# Patient Record
Sex: MALE | Race: WHITE
[De-identification: names, ages, dates, MRNs, and addresses within clinical notes are randomized per-mention and may not be internally consistent; named-entity substitution may affect disease eponyms.]

---

## 2020-06-01 ENCOUNTER — HOSPITAL ENCOUNTER (EMERGENCY)
Dept: HOSPITAL 62 - ER | Age: 40
Discharge: ANOTHER HOSPITAL | End: 2020-06-01
Payer: SELF-PAY

## 2020-06-01 VITALS — SYSTOLIC BLOOD PRESSURE: 147 MMHG | DIASTOLIC BLOOD PRESSURE: 108 MMHG

## 2020-06-01 DIAGNOSIS — G81.94: ICD-10-CM

## 2020-06-01 DIAGNOSIS — R29.810: ICD-10-CM

## 2020-06-01 DIAGNOSIS — I10: ICD-10-CM

## 2020-06-01 DIAGNOSIS — R20.0: ICD-10-CM

## 2020-06-01 DIAGNOSIS — E11.9: ICD-10-CM

## 2020-06-01 DIAGNOSIS — F17.200: ICD-10-CM

## 2020-06-01 DIAGNOSIS — R51: ICD-10-CM

## 2020-06-01 DIAGNOSIS — R29.708: ICD-10-CM

## 2020-06-01 DIAGNOSIS — I63.9: Primary | ICD-10-CM

## 2020-06-01 LAB
ADD MANUAL DIFF: NO
ALBUMIN SERPL-MCNC: 4.3 G/DL (ref 3.5–5)
ALP SERPL-CCNC: 57 U/L (ref 38–126)
ANION GAP SERPL CALC-SCNC: 10 MMOL/L (ref 5–19)
APTT BLD: 26.1 SEC (ref 23.5–35.8)
AST SERPL-CCNC: 45 U/L (ref 17–59)
BASOPHILS # BLD AUTO: 0.1 10^3/UL (ref 0–0.2)
BASOPHILS NFR BLD AUTO: 0.9 % (ref 0–2)
BILIRUB DIRECT SERPL-MCNC: 0.2 MG/DL (ref 0–0.4)
BILIRUB SERPL-MCNC: 0.7 MG/DL (ref 0.2–1.3)
BUN SERPL-MCNC: 8 MG/DL (ref 7–20)
CALCIUM: 9 MG/DL (ref 8.4–10.2)
CHLORIDE SERPL-SCNC: 94 MMOL/L (ref 98–107)
CK MB SERPL-MCNC: 3.52 NG/ML (ref ?–4.55)
CK SERPL-CCNC: 462 U/L (ref 55–170)
CO2 SERPL-SCNC: 25 MMOL/L (ref 22–30)
EOSINOPHIL # BLD AUTO: 0.1 10^3/UL (ref 0–0.6)
EOSINOPHIL NFR BLD AUTO: 0.7 % (ref 0–6)
ERYTHROCYTE [DISTWIDTH] IN BLOOD BY AUTOMATED COUNT: 13.6 % (ref 11.5–14)
GLUCOSE SERPL-MCNC: 320 MG/DL (ref 75–110)
HCT VFR BLD CALC: 47.5 % (ref 37.9–51)
HGB BLD-MCNC: 16.6 G/DL (ref 13.5–17)
INR PPP: 0.93
LYMPHOCYTES # BLD AUTO: 2.9 10^3/UL (ref 0.5–4.7)
LYMPHOCYTES NFR BLD AUTO: 32.4 % (ref 13–45)
MCH RBC QN AUTO: 33 PG (ref 27–33.4)
MCHC RBC AUTO-ENTMCNC: 34.9 G/DL (ref 32–36)
MCV RBC AUTO: 95 FL (ref 80–97)
MONOCYTES # BLD AUTO: 0.6 10^3/UL (ref 0.1–1.4)
MONOCYTES NFR BLD AUTO: 6.1 % (ref 3–13)
NEUTROPHILS # BLD AUTO: 5.4 10^3/UL (ref 1.7–8.2)
NEUTS SEG NFR BLD AUTO: 59.9 % (ref 42–78)
PLATELET # BLD: 187 10^3/UL (ref 150–450)
POTASSIUM SERPL-SCNC: 4.6 MMOL/L (ref 3.6–5)
PROT SERPL-MCNC: 7.2 G/DL (ref 6.3–8.2)
PROTHROMBIN TIME: 12.5 SEC (ref 11.4–15.4)
RBC # BLD AUTO: 5.02 10^6/UL (ref 4.35–5.55)
TOTAL CELLS COUNTED % (AUTO): 100 %
TROPONIN I SERPL-MCNC: < 0.012 NG/ML
WBC # BLD AUTO: 9 10^3/UL (ref 4–10.5)

## 2020-06-01 PROCEDURE — 85025 COMPLETE CBC W/AUTO DIFF WBC: CPT

## 2020-06-01 PROCEDURE — 93005 ELECTROCARDIOGRAM TRACING: CPT

## 2020-06-01 PROCEDURE — 82550 ASSAY OF CK (CPK): CPT

## 2020-06-01 PROCEDURE — 37195 THROMBOLYTIC THERAPY STROKE: CPT

## 2020-06-01 PROCEDURE — 82553 CREATINE MB FRACTION: CPT

## 2020-06-01 PROCEDURE — 80053 COMPREHEN METABOLIC PANEL: CPT

## 2020-06-01 PROCEDURE — 96375 TX/PRO/DX INJ NEW DRUG ADDON: CPT

## 2020-06-01 PROCEDURE — 99291 CRITICAL CARE FIRST HOUR: CPT

## 2020-06-01 PROCEDURE — 85730 THROMBOPLASTIN TIME PARTIAL: CPT

## 2020-06-01 PROCEDURE — 36415 COLL VENOUS BLD VENIPUNCTURE: CPT

## 2020-06-01 PROCEDURE — 85610 PROTHROMBIN TIME: CPT

## 2020-06-01 PROCEDURE — 71045 X-RAY EXAM CHEST 1 VIEW: CPT

## 2020-06-01 PROCEDURE — 93010 ELECTROCARDIOGRAM REPORT: CPT

## 2020-06-01 PROCEDURE — 70450 CT HEAD/BRAIN W/O DYE: CPT

## 2020-06-01 PROCEDURE — 84484 ASSAY OF TROPONIN QUANT: CPT

## 2020-06-01 NOTE — RADIOLOGY REPORT (SQ)
EXAM DESCRIPTION:  CHEST SINGLE VIEW



IMAGES COMPLETED DATE/TIME:  6/1/2020 4:48 pm



REASON FOR STUDY:  stroke alert



COMPARISON:  AP view of the chest from 3/20/2020.



EXAM PARAMETERS:  NUMBER OF VIEWS: One view.

TECHNIQUE:  An AP view of the chest was obtained.

RADIATION DOSE: NA

LIMITATIONS: None.



FINDINGS:  LUNGS AND PLEURA: No consolidation, pleural effusion or pneumothorax.

MEDIASTINUM AND HILAR STRUCTURES: No mediastinal or hilar contour abnormality.

HEART AND VASCULAR STRUCTURES: The cardiac silhouette and pulmonary vasculature are within normal hartman
its.

BONES: No acute findings.

HARDWARE: None in the chest.

OTHER: No other finding.



IMPRESSION:  No acute cardiopulmonary process.



TECHNICAL DOCUMENTATION:  JOB ID:  [id number]

 2011 [employer]- All Rights Reserved



Reading location - IP/workstation name: [email or web address]

## 2020-06-01 NOTE — RADIOLOGY REPORT (SQ)
EXAM DESCRIPTION:  CT HEAD WITHOUT



IMAGES COMPLETED DATE/TIME:  6/1/2020 4:50 pm



REASON FOR STUDY:  stroke alert



COMPARISON:  None.



TECHNIQUE:  Axial images acquired through the brain without intravenous contrast.  Images reviewed wi
th bone, brain and subdural windows.  Additional sagittal and coronal reconstructions were generated.
 Images stored on PACS.

All CT scanners at this facility use dose modulation, iterative reconstruction, and/or weight based d
osing when appropriate to reduce radiation dose to as low as reasonably achievable (ALARA).

[facility]: Dose Right  [facility]: CareDose    [facility]: Dose Right    [facility]: Teradose 4D    [facility]: Smart Technologies



RADIATION DOSE:   mGy.



LIMITATIONS:  None.



FINDINGS:  VENTRICLES: Normal size and contour.

CEREBRUM: No masses.  No hemorrhage.  No midline shift.  No evidence for acute infarction. Normal gra
y/white matter differentiation. No areas of low density in the white matter.

CEREBELLUM: No masses.  No hemorrhage.  No alteration of density.  No evidence for acute infarction.

EXTRAAXIAL SPACES: No fluid collections.  No masses.

ORBITS AND GLOBE: No intra- or extraconal masses.  Normal contour of globe without masses.

CALVARIUM: No fracture.

PARANASAL SINUSES: No fluid or mucosal thickening.

SOFT TISSUES: No mass or hematoma.

OTHER: No other significant finding.



IMPRESSION:  NORMAL BRAIN CT WITHOUT CONTRAST.

EVIDENCE OF ACUTE STROKE: NO.



COMMENT:   Pertinent positive or negative findings of the imaging study reported as a CRITICAL EXAM lorena PAK NP at16:57 on 6/1/2020.

Category of Critical Exam: Stroke alert.

Quality ID # 436: Final reports with documentation of one or more dose reduction techniques (e.g., Au
tomated exposure control, adjustment of the mA and/or kV according to patient size, use of iterative 
reconstruction technique)



TECHNICAL DOCUMENTATION:  JOB ID:  [id number]

 2011 [employer]- All Rights Reserved



Reading location - IP/workstation name: [email or web address]

## 2020-06-01 NOTE — ER DOCUMENT REPORT
ED General





- General


Chief Complaint: S/S of Possible Stroke


Stated Complaint: MOUTH/FACE NUMBNESS


Time Seen by Provider: 06/01/20 16:41


Mode of Arrival: Wheelchair


Notes: 





39-year male with history of untreated diabetes hyperlipidemia obesity 

hypertension presents with 2 weeks of left facial pain followed by about an hour

of facial numbness.  He is noted to have left-sided weakness and when asked when

it started he is not sure but he says everything started when he got here.  He 

also thinks the facial weakness pain started 4 hours ago his girlfriend reports 

less than 1 hour.  He is not altered, but just a difficult historian.  He was 

activated as a stroke alert at triage.


He does not have a history of brain bleeds aneurysms any kind of easy bruising 

bleeding or recent surgeries.


TRAVEL OUTSIDE OF THE [place] IN LAST 30 DAYS: No





- Related Data


Allergies/Adverse Reactions: 


                                        





No Known Allergies Allergy (Verified 03/20/19 06:54)


   











Past Medical History





- General


Information source: Patient





- Social History


Smoking Status: Current Every Day Smoker


Family History: CAD, Hypertension, Thyroid Disfunction


Patient has homicidal ideation: No





- Past Medical History


Cardiac Medical History: Reports: Hx Hypertension


Endocrine Medical History: Reports: Hx Diabetes Mellitus Type 2


Renal/ Medical History: Denies: Hx Peritoneal Dialysis


Musculoskeletal Medical History: Reports Hx Arthritis


Psychiatric Medical History: Reports: Hx Depression





- Immunizations


Hx Diphtheria, Pertussis, Tetanus Vaccination: Yes - 2003





Review of Systems





- Review of Systems


Notes: 





REVIEW OF SYSTEMS





GEN: Denies fever, chills, weight loss


ENT: Denies sore throat, nasal discharge, ear pain


EYES: Denies blurry vision, eye pain, discharge


CV: Denies chest pain, palpitations, edema


RESP: Denies cough, shortness of breath, wheezing


GI: Denies abdominal pain, nausea, vomiting, diarrhea


MSK: Denies joint pain/swelling, edema, 


SKIN: Denies rash, skin lesions


LYMPH: Denies swollen glands/lymph nodes


NEURO: See HPI


PSYCH: Denies depression, suicidal or homicidal ideation








PHYSICAL EXAMINATION





General: No acute distress, well-nourished


Head: Atraumatic, normocephalic


ENT: Mouth normal, oropharynx moist, no exudates or tonsillar enlargement


Eyes: Conjunctiva normal, pupils equal, lids normal


Neck: No JVD, supple, no guarding


CVS: Normal rate, regular rhythm, no murmurs


Resp: No resp distress, equal and normal breath sounds bilaterally


GI: Nondistended, soft, no tenderness to palpation, no rebound or guarding


Ext: No deformities, no edema, normal range of motion in upper and lower ext


Back: No CVA or midline TTP


Skin: No rash, warm


Lymphatic: No lymphadeopathy noted


Neuro: Awake, alert.  Right-sided facial droop with decreased upstroke with 

preserved forehead.  4 out of 5 4- out of 5 left arm and leg


NIH stroke score is 8 rate score is 3





Physical Exam





- Vital signs


Vitals: 


                                        











Temp Pulse Resp BP Pulse Ox


 


 98.7 F   133 H  22 H  151/118 H  95 


 


 06/01/20 16:44  06/01/20 16:44  06/01/20 16:44  06/01/20 16:44  06/01/20 16:44














Course





- Re-evaluation


Re-evalutation: 





06/01/20 17:04


Acute neurologic deficits.  In the setting of face pain for a while, however 

because of pain like migraine infection digital neuralgia should not cause left-

sided weakness, NIH stroke score is 8 , Race score is 3


On exam the patient has right-sided facial droop and a peripheral cranial nerve 

VII pattern as well as significant left arm and leg droop


He is obese with a history of multiple stroke risk factors and has been out of 

care for quite a while.


Reviewed in depth the alteplase inclusion criteria and risk factors


He is within a 4.5-hour window, he meets criteria for extended TPA window


He has no contraindication


He was consented by me verbally including risks of bleed and death and agreed


I paged [facility] for transfer of stroke at 503 p.m.


His noncontrast CT was read negative at 5 PM


I am sending him back for a CT CTA





06/01/20 17:08





06/01/20 17:47


Discussed with Dr. Copeland.  Accepted.  Miah arrived sooner than we thought, 

and TPA was infusing.  I will forego vascular imaging and get the patient to the

stroke center.  Estimate length with he and his girlfriend.





- Vital Signs


Vital signs: 


                                        











Temp Pulse Resp BP Pulse Ox


 


 98.7 F   133 H  27 H  147/108 H  98 


 


 06/01/20 17:00  06/01/20 17:00  06/01/20 17:01  06/01/20 17:01  06/01/20 17:01














- Laboratory


Result Diagrams: 


                                 06/01/20 16:55





                                 06/01/20 16:55


Laboratory results interpreted by me: 


                                        











  06/01/20





  16:55


 


Sodium  129.4 L


 


Chloride  94 L


 


Glucose  320 H


 


Creatine Kinase  462 H














- Diagnostic Test


Radiology reviewed: Image reviewed, Reports reviewed





Critical Care Note





- Critical Care Note


Total time excluding time spent on procedures (mins): 40


Comments: 





The above patient is critically ill.  Not including procedures, but including 

direct re-evaluations, speaking with patient and/or consultants, interpreting 

results, and documenting, I spent the total amount of minute listed listed above

on critical care time





Discharge





- Discharge


Clinical Impression: 


Stroke


Qualifiers:


 CVA mechanism: unspecified Qualified Code(s): I63.9 - Cerebral infarction, 

unspecified





Condition: Critical


Disposition: [facility]

## 2020-06-01 NOTE — ER DOCUMENT REPORT
ED Medical Screen (RME)





- General


Chief Complaint: Numbness of Face


Stated Complaint: MOUTH/FACE NUMBNESS


Time Seen by Provider: 06/01/20 16:41


Mode of Arrival: Wheelchair


Information source: Patient


Notes: 





39-year-old male patient presents the emergency department as a stroke alert.  

Patient reports for the last 2 to 3 months he has been having pain to the right 

side of his face.  He states that 3 to 4 hours ago he started having numbness to

the left side of his face, slurred speech and weakness in his left upper 

extremity.  He has facial drooping, he has definitely limited strength in his 

upper extremity.  Stroke alert was called, patient was taken straight to CT.  

Attending physician on the main side of the ER made aware.





I have greeted and performed a rapid initial assessment of this patient.  A 

comprehensive ED assessment and evaluation of the patient, analysis of test 

results and completion of the medical decision making process will be conducted 

by additional ED providers.  I have specifically instructed the patient or 

family members with the patient to immediately return to any nursing staff 

should anything change in the patient's condition or with their chief complaint.





TRAVEL OUTSIDE OF THE [place] IN LAST 30 DAYS: No





- Related Data


Allergies/Adverse Reactions: 


                                        





No Known Allergies Allergy (Verified 03/20/19 06:54)


   











Past Medical History





- Past Medical History


Cardiac Medical History: Reports: Hx Hypertension


Endocrine Medical History: Reports: Hx Diabetes Mellitus Type 2


Renal/ Medical History: Denies: Hx Peritoneal Dialysis


Musculoskeltal Medical History: Reports Hx Arthritis


Psychiatric Medical History: Reports: Hx Depression





- Immunizations


Hx Diphtheria, Pertussis, Tetanus Vaccination: Yes - 2003

## 2020-06-01 NOTE — EKG REPORT
SEVERITY:- OTHERWISE NORMAL ECG -

SINUS TACHYCARDIA

:

Confirmed by: Mary Ayala 01-Jun-2020 22:39:33

## 2020-07-02 ENCOUNTER — HOSPITAL ENCOUNTER (EMERGENCY)
Dept: HOSPITAL 62 - ER | Age: 40
Discharge: HOME | End: 2020-07-02
Payer: SELF-PAY

## 2020-07-02 VITALS — SYSTOLIC BLOOD PRESSURE: 130 MMHG | DIASTOLIC BLOOD PRESSURE: 87 MMHG

## 2020-07-02 DIAGNOSIS — R19.7: ICD-10-CM

## 2020-07-02 DIAGNOSIS — R10.9: Primary | ICD-10-CM

## 2020-07-02 DIAGNOSIS — I10: ICD-10-CM

## 2020-07-02 DIAGNOSIS — Z86.73: ICD-10-CM

## 2020-07-02 DIAGNOSIS — F17.200: ICD-10-CM

## 2020-07-02 DIAGNOSIS — E11.9: ICD-10-CM

## 2020-07-02 DIAGNOSIS — E78.5: ICD-10-CM

## 2020-07-02 DIAGNOSIS — R11.2: ICD-10-CM

## 2020-07-02 LAB
ADD MANUAL DIFF: NO
ALBUMIN SERPL-MCNC: 4.6 G/DL (ref 3.5–5)
ALP SERPL-CCNC: 70 U/L (ref 38–126)
ANION GAP SERPL CALC-SCNC: 10 MMOL/L (ref 5–19)
APPEARANCE UR: (no result)
APTT PPP: (no result) S
AST SERPL-CCNC: 40 U/L (ref 17–59)
BASOPHILS # BLD AUTO: 0.1 10^3/UL (ref 0–0.2)
BASOPHILS NFR BLD AUTO: 0.7 % (ref 0–2)
BILIRUB DIRECT SERPL-MCNC: 0.1 MG/DL (ref 0–0.4)
BILIRUB SERPL-MCNC: 0.8 MG/DL (ref 0.2–1.3)
BILIRUB UR QL STRIP: (no result)
BUN SERPL-MCNC: 14 MG/DL (ref 7–20)
CALCIUM: 10 MG/DL (ref 8.4–10.2)
CHLORIDE SERPL-SCNC: 97 MMOL/L (ref 98–107)
CO2 SERPL-SCNC: 27 MMOL/L (ref 22–30)
EOSINOPHIL # BLD AUTO: 0.1 10^3/UL (ref 0–0.6)
EOSINOPHIL NFR BLD AUTO: 0.6 % (ref 0–6)
ERYTHROCYTE [DISTWIDTH] IN BLOOD BY AUTOMATED COUNT: 12.7 % (ref 11.5–14)
GLUCOSE SERPL-MCNC: 241 MG/DL (ref 75–110)
GLUCOSE UR STRIP-MCNC: 50 MG/DL
HCT VFR BLD CALC: 48.5 % (ref 37.9–51)
HGB BLD-MCNC: 17.1 G/DL (ref 13.5–17)
KETONES UR STRIP-MCNC: NEGATIVE MG/DL
LYMPHOCYTES # BLD AUTO: 2.1 10^3/UL (ref 0.5–4.7)
LYMPHOCYTES NFR BLD AUTO: 23.4 % (ref 13–45)
MCH RBC QN AUTO: 32.8 PG (ref 27–33.4)
MCHC RBC AUTO-ENTMCNC: 35.2 G/DL (ref 32–36)
MCV RBC AUTO: 93 FL (ref 80–97)
MONOCYTES # BLD AUTO: 0.8 10^3/UL (ref 0.1–1.4)
MONOCYTES NFR BLD AUTO: 8.5 % (ref 3–13)
NEUTROPHILS # BLD AUTO: 6 10^3/UL (ref 1.7–8.2)
NEUTS SEG NFR BLD AUTO: 66.8 % (ref 42–78)
PH UR STRIP: 5 [PH] (ref 5–9)
PLATELET # BLD: 200 10^3/UL (ref 150–450)
POTASSIUM SERPL-SCNC: 4.6 MMOL/L (ref 3.6–5)
PROT SERPL-MCNC: 7.8 G/DL (ref 6.3–8.2)
PROT UR STRIP-MCNC: 100 MG/DL
RBC # BLD AUTO: 5.2 10^6/UL (ref 4.35–5.55)
SP GR UR STRIP: 1.03
TOTAL CELLS COUNTED % (AUTO): 100 %
UROBILINOGEN UR-MCNC: 2 MG/DL (ref ?–2)
WBC # BLD AUTO: 9 10^3/UL (ref 4–10.5)

## 2020-07-02 PROCEDURE — 83690 ASSAY OF LIPASE: CPT

## 2020-07-02 PROCEDURE — 80053 COMPREHEN METABOLIC PANEL: CPT

## 2020-07-02 PROCEDURE — 96374 THER/PROPH/DIAG INJ IV PUSH: CPT

## 2020-07-02 PROCEDURE — 99284 EMERGENCY DEPT VISIT MOD MDM: CPT

## 2020-07-02 PROCEDURE — 85025 COMPLETE CBC W/AUTO DIFF WBC: CPT

## 2020-07-02 PROCEDURE — 74177 CT ABD & PELVIS W/CONTRAST: CPT

## 2020-07-02 PROCEDURE — 81001 URINALYSIS AUTO W/SCOPE: CPT

## 2020-07-02 PROCEDURE — 36415 COLL VENOUS BLD VENIPUNCTURE: CPT

## 2020-07-02 PROCEDURE — 96361 HYDRATE IV INFUSION ADD-ON: CPT

## 2020-07-02 NOTE — ER DOCUMENT REPORT
ED GI/





- General


Chief Complaint: Nausea/Vomiting/Diarrhea


Stated Complaint: NAUSEA/VOMITING


Time Seen by Provider: 07/02/20 13:09


Primary Care Provider: 


YESENIA DON MD [COMMUNITY BASED STAFF] - Follow up tomorrow (Call for follow-up 

appointment as soon as possible.)


Information source: Patient


Notes: 





40-year-old male past medical history significant for recent CVA, hypertension, 

diabetes, hyperlipidemia presents to the emergency room complaining of abdominal

pain which he describes as generalized pressure, nausea, vomiting, diarrhea.  

States he is having 10 episodes of diarrhea daily.  States his symptoms have 

been going on for the past month.  Gets worse after he eats.  Has tried taking 

Pepto-Bismol and Imodium without relief.  Denies any recent travel.  Denies any 

COVID-19 exposure.  Patient states he was seen here and transferred to [facility] patient states after discharge she has not followed up with any

other providers.  States most of his medications are new in the past month.  

Denies any other recent travel.  No COVID-19 exposure.


TRAVEL OUTSIDE OF THE [place] IN LAST 30 DAYS: No





- Related Data


Allergies/Adverse Reactions: 


                                        





No Known Allergies Allergy (Verified 07/02/20 11:58)


   











Past Medical History





- General


Information source: Patient





- Social History


Smoking Status: Current Some Day Smoker


Frequency of alcohol use: None


Drug Abuse: None


Family History: CAD, Hypertension, Thyroid Disfunction


Patient has homicidal ideation: No





- Past Medical History


Cardiac Medical History: Reports: Hx Hypertension


Neurological Medical History: Reports: Hx Cerebrovascular Accident


Endocrine Medical History: Reports: Hx Diabetes Mellitus Type 2


Renal/ Medical History: Denies: Hx Peritoneal Dialysis


Musculoskeletal Medical History: Reports Hx Arthritis


Psychiatric Medical History: Reports: Hx Depression





- Immunizations


Hx Diphtheria, Pertussis, Tetanus Vaccination: Yes - 2003





Review of Systems





- Review of Systems


Constitutional: No symptoms reported


EENT: No symptoms reported


Cardiovascular: No symptoms reported


Respiratory: No symptoms reported


Gastrointestinal: Abdominal pain, Diarrhea, Nausea, Vomiting


Genitourinary: No symptoms reported


Musculoskeletal: No symptoms reported


Skin: No symptoms reported


Neurological/Psychological: No symptoms reported


-: Yes All other systems reviewed and negative





Physical Exam





- Vital signs


Vitals: 


                                        











Temp Pulse Resp BP Pulse Ox


 


 97.6 F   108 H  20   152/110 H  96 


 


 07/02/20 11:20  07/02/20 11:20  07/02/20 11:20  07/02/20 11:20  07/02/20 11:20














- Notes


Notes: 





VITAL SIGNS: Within normal limits.


GENERAL: Mild acute distress, non-toxic appearance.  


HEAD:  Normal with no signs of head trauma.


EYES:  PERRLA, EOMI, conjunctiva normal, no discharge.  


EARS:  Hearing grossly intact.


NOSE: Normal.


THROAT:  Oropharynx is normal. 


NECK:  Normal range of motion, no tenderness, supple, no lymphadenopathy, No 

adenopathy, no JVD.   


CHEST:  Clear breath sounds bilaterally.  No wheezes, rales, or rhonchi.  


CARDIAC:  Regular rate and rhythm.  S1 and S2, without murmurs, gallops, or 

rubs.


VASCULAR:  No Edema.  Peripheral pulses normal and equal in all extremities.


ABDOMEN: Normal and soft with no tenderness, no masses or pulsatile masses.  No 

organomegaly, no rebound.  Bowel sounds x4.


GASTROINTESTINAL: Bowel sounds normal


GENITOURINARY: Normal, No tenderness


LYMPATHTIC:  No lymphadenopathy noted.


MUSCULOSKELETAL:  Good range of motion of all major joints. Extremities without 

clubbing, cyanosis or edema.  


NEUROLOGICAL:  Alert and oriented x 3.  No focal sensory or strength deficits.  

Speech normal.  Follows commands appropriately.


PSYCHIATRIC:  Normal Affect, judgement and mood.


SKIN:  Normal appearance with no rashes or lesions.





Course





- Re-evaluation


Re-evalutation: 





07/02/20 17:56


Patient is resting comfortably he is pain-free on exam.  He is afebrile and 

nontoxic-appearing.  Vital signs have improved.  Patient has had no vomiting or 

diarrhea since arrival to the emergency room.  He is able to tolerate p.o. 

fluids.  All test results were reviewed with the patient.  Commended clear 

liquid diet for the next 24 hours and then advance to bland diet as tolerated.  

He was counseled on need to follow-up outpatient with a primary care physician f

or further evaluation, on-call physician was provided.  Patient was given strict

return to the emergency room guidelines.  Return for any new or worsening 

symptoms.  All questions were answered.  Patient verbalized understanding and 

agrees with plan of care.


07/02/20 21:30





07/02/20 21:31








- Vital Signs


Vital signs: 


                                        











Temp Pulse Resp BP Pulse Ox


 


 98.1 F   100   16   130/87 H  96 


 


 07/02/20 18:02  07/02/20 18:02  07/02/20 18:02  07/02/20 18:02  07/02/20 18:02














- Laboratory


Result Diagrams: 


                                 07/02/20 14:34





                                 07/02/20 14:34


Laboratory results interpreted by me: 


                                        











  07/02/20 07/02/20 07/02/20





  14:34 14:34 14:40


 


Hgb  17.1 H  


 


Sodium   134.2 L 


 


Chloride   97 L 


 


Glucose   241 H 


 


Urine Protein    100 H


 


Urine Glucose (UA)    50 H


 


Urine Bilirubin    SMALL H


 


Urine Urobilinogen    2.0 H














- Diagnostic Test


Radiology reviewed: Reports reviewed





Discharge





- Discharge


Clinical Impression: 


 Abdominal pain of unknown etiology





Nausea & vomiting


Qualifiers:


 Vomiting type: unspecified Vomiting Intractability: non-intractable Qualified 

Code(s): R11.2 - Nausea with vomiting, unspecified





Diarrhea


Qualifiers:


 Diarrhea type: unspecified type Qualified Code(s): R19.7 - Diarrhea, unspeci

fied





Condition: Stable


Disposition: HOME, SELF-CARE


Instructions:  Abdominal Pain ([facility]), Antinausea Medication ([facility]), Diarrhea, 

Nonspecific ([facility]), Vomiting ([facility])


Additional Instructions: 


Clear liquid diet for the next 24 hours.  Nausea medication as prescribed.  

Outpatient follow-up with a primary care physician please call as soon as 

possible for an appointment.  Return to the emergency room for any new or 

worsening symptoms.


Prescriptions: 


Ondansetron [Zofran Odt 4 mg Tablet] 1 tab PO Q4H PRN #15 tab.rapdis


 PRN Reason: For Nausea/Vomiting


Referrals: 


YESENIA DON MD [COMMUNITY BASED STAFF] - Follow up tomorrow (Call for follow-up 

appointment as soon as possible.)

## 2020-07-02 NOTE — RADIOLOGY REPORT (SQ)
EXAM DESCRIPTION:  CT ABD/PELVIS WITH IV ONLY



IMAGES COMPLETED DATE/TIME:  7/2/2020 3:34 pm



REASON FOR STUDY:  abdominal pain



COMPARISON:  None.



TECHNIQUE:  CT scan of the abdomen and pelvis performed using helical scanning technique with dynamic
 intravenous contrast injection.  No oral contrast. Images reviewed with lung, soft tissue, and bone 
windows. Reconstructed coronal and sagittal MPR images reviewed. Delayed images for evaluation of the
 urinary system also acquired. All images stored on PACS.

All CT scanners at this facility use dose modulation, iterative reconstruction, and/or weight based d
osing when appropriate to reduce radiation dose to as low as reasonably achievable (ALARA).

[facility]: Dose Right  [facility]: CareDose    [facility]: Dose Right    [facility]: Teradose 4D    [facility]: [employer]



CONTRAST TYPE AND DOSE:  contrast/concentration: Isovue 350.00 mmol/ml; Total Contrast Delivered: 100
.0 ml; Total Saline Delivered: 72.0 ml



RENAL FUNCTION:  BUN 14 creatinine 1



RADIATION DOSE:  CT Rad equipment meets quality standard of care and radiation dose reduction techniq
ues were employed. CTDIvol: 19.2 - 21.1 mGy. DLP: 2578 mGy-cm..



LIMITATIONS:  None.



FINDINGS:  LOWER CHEST: No significant findings. No nodules or infiltrates.

LIVER: There is mild hypoattenuation of the liver.  No mass.

SPLEEN: Normal size. No focal lesions.

PANCREAS: No masses. No significant calcifications. No adjacent inflammation or peripancreatic fluid 
collections. Pancreatic duct not dilated.

GALLBLADDER: No identified stones by CT criteria. No inflammatory changes to suggest cholecystitis.

ADRENAL GLANDS: No significant masses or asymmetry.

RIGHT KIDNEY AND URETER: No solid masses.   No significant calcifications.   No hydronephrosis or hyd
roureter.

LEFT KIDNEY AND URETER: No solid masses.   No significant calcifications.   No hydronephrosis or hydr
oureter.

AORTA AND VESSELS: No aneurysm. No dissection. Renal arteries, SMA, celiac without stenosis.

RETROPERITONEUM: No retroperitoneal adenopathy, hemorrhage or masses.

BOWEL AND PERITONEAL CAVITY: No masses or inflammatory changes. No free fluid or peritoneal masses.

APPENDIX: Normal.

PELVIS: No mass.  No free fluid. Normal bladder.

ABDOMINAL WALL: No masses. No hernias.

BONES: No significant or acute findings.

OTHER: No other significant finding.



IMPRESSION:  There appears to be mild hepatic steatosis.  No other significant finding is seen in the
 abdomen or pelvis.



TECHNICAL DOCUMENTATION:  JOB ID:  [id number]

Quality ID # 436: Final reports with documentation of one or more dose reduction techniques (e.g., Au
tomated exposure control, adjustment of the mA and/or kV according to patient size, use of iterative 
reconstruction technique)

 2011 [employer]- All Rights Reserved



Reading location - IP/workstation name: [email or web address]

## 2020-09-14 ENCOUNTER — HOSPITAL ENCOUNTER (EMERGENCY)
Dept: HOSPITAL 62 - ER | Age: 40
Discharge: HOME | End: 2020-09-14
Payer: SELF-PAY

## 2020-09-14 VITALS — DIASTOLIC BLOOD PRESSURE: 116 MMHG | SYSTOLIC BLOOD PRESSURE: 153 MMHG

## 2020-09-14 DIAGNOSIS — K08.89: ICD-10-CM

## 2020-09-14 DIAGNOSIS — F17.210: ICD-10-CM

## 2020-09-14 DIAGNOSIS — Z91.14: ICD-10-CM

## 2020-09-14 DIAGNOSIS — T46.4X6A: ICD-10-CM

## 2020-09-14 DIAGNOSIS — I10: ICD-10-CM

## 2020-09-14 DIAGNOSIS — K02.9: Primary | ICD-10-CM

## 2020-09-14 DIAGNOSIS — E11.9: ICD-10-CM

## 2020-09-14 PROCEDURE — 99283 EMERGENCY DEPT VISIT LOW MDM: CPT

## 2020-09-14 NOTE — ER DOCUMENT REPORT
Entered by HEMANTH JOHNSON SCRIBE  09/14/20 0658 





Acting as scribe for:JANIYA VARELA MD





ED Oral Problem





- General


Chief Complaint: Toothache


Stated Complaint: TOOTHACHE


Time Seen by Provider: 09/14/20 06:16


Information source: Patient


Notes: 





This 40 year old male patient presents to the emergency department today with 

right mouth pain. Patient states he felt a filling break last night in the 

bottom right of his mouth. Patient states BC powder and Orajel have not provided

relief. Denies any fever, chills, or ear pain. Patient reports history of HTN 

and DM, which he has not been taking medication for lately. Patient also reports

a TIA x3 months ago, and was treated at [facility]. 


TRAVEL OUTSIDE OF THE [place] IN LAST 30 DAYS: No





- Related Data


Allergies/Adverse Reactions: 


                                        





No Known Allergies Allergy (Verified 07/02/20 11:58)


   








Home Medications: Lisinopril





Past Medical History





- General


Information source: Patient





- Social History


Smoking Status: Current Every Day Smoker


Cigarette use (# per day): Yes


Family History: CAD, Hypertension, Thyroid Disfunction





- Past Medical History


Cardiac Medical History: Reports: Hx Hypertension


Neurological Medical History: Reports: Other - TIA 2020


Endocrine Medical History: Reports: Hx Diabetes Mellitus Type 2


Musculoskeletal Medical History: Reports Hx Arthritis


Psychiatric Medical History: Reports: Hx Depression





- Immunizations


Hx Diphtheria, Pertussis, Tetanus Vaccination: Yes - 2003





Review of Systems





- Review of Systems


Constitutional: See HPI.  denies: Chills, Fever


EENT: See HPI, Mouth pain - R, Dental problem - R bottom tooth.  denies: Ear 

pain


Cardiovascular: No symptoms reported


Respiratory: No symptoms reported


Gastrointestinal: No symptoms reported


Genitourinary: No symptoms reported


Male Genitourinary: No symptoms reported


Musculoskeletal: No symptoms reported


Skin: No symptoms reported


Hematologic/Lymphatic: No symptoms reported


Neurological/Psychological: No symptoms reported


-: Yes All other systems reviewed and negative





Physical Exam





- Vital signs


Vitals: 


                                        











Temp Pulse Resp BP Pulse Ox


 


 98.6 F   113 H  16   183/120 H  97 


 


 09/14/20 04:43  09/14/20 04:43  09/14/20 04:43  09/14/20 04:43  09/14/20 04:43














- General


General appearance: Appears well, Alert





- HEENT


Head: Normocephalic, Atraumatic


Eyes: Normal


Pupils: PERRL


Ears: Normal


External canal: Normal


Tympanic membrane: Normal


Nasal: Normal


Neck: Normal, Supple.  No: Lymphadenopathy


Notes: 


Filling broken off on the right bottom 2nd molar.


Carious teeth in the upper left mouth.


No swelling of the face or inside the mouth. 





- Respiratory


Respiratory status: No respiratory distress


Chest status: Nontender


Breath sounds: Normal


Chest palpation: Normal





- Cardiovascular


Rhythm: Regular


Heart sounds: Normal auscultation


Murmur: No





- Abdominal


Inspection: Normal


Distension: No distension


Bowel sounds: Normal


Tenderness: Nontender





- Extremities


General upper extremity: Normal inspection, Normal ROM.  No: Edema


General lower extremity: Normal inspection, Normal ROM.  No: Edema





- Neurological


Neuro grossly intact: Yes


Cognition: Normal


Orientation: AAOx4


Speech: Normal


Sensory: Normal





- Psychological


Associated symptoms: Normal affect, Normal mood





- Skin


Skin Temperature: Warm


Skin Moisture: Dry


Skin Color: Normal





Course





- Re-evaluation


Re-evalutation: 





09/14/20 09:30


Patient states that his toothache is somewhat improved after having a Percocet 

tablet.  Patient was also given a tablet for hypertension patient is 

noncompliant on antihypertensive medications for several months.





- Vital Signs


Vital signs: 


                                        











Temp Pulse Resp BP Pulse Ox


 


 98.2 F   99   16   153/116 H  99 


 


 09/14/20 09:08  09/14/20 09:08  09/14/20 09:08  09/14/20 09:08  09/14/20 09:08











09/14/20 09:31


Vital signs still show a systolic diastolic high blood pressure though improved 

currently is 153/116.  Plan is to put patient back on antihypertensive 

medications.





Discharge





- Discharge


Clinical Impression: 


 Hypertensive disease, Tooth ache, Tooth fracture without loss of restorative 

material





Condition: Stable


Disposition: HOME, SELF-CARE


Instructions:  Oral Narcotic Medication ([facility]), Toothache ([facility]), [facility]


Prescriptions: 


Tramadol HCl [Ultram 50 mg Tablet] 50 mg PO Q6HP PRN #24 tablet


 PRN Reason: severe pain


Amoxicillin 875 mg PO BID #20 tablet


Lisinopril [Zestril] 20 mg PO DAILY #30 tablet


Forms:  Elevated Blood Pressure





I personally performed the services described in the documentation, reviewed and

 edited the documentation which was dictated to the scribe in my presence, and 

it accurately records my words and actions.